# Patient Record
Sex: MALE | Race: WHITE | HISPANIC OR LATINO | URBAN - NONMETROPOLITAN AREA
[De-identification: names, ages, dates, MRNs, and addresses within clinical notes are randomized per-mention and may not be internally consistent; named-entity substitution may affect disease eponyms.]

---

## 2017-06-28 ENCOUNTER — FOLLOW UP ESTABLISHED (OUTPATIENT)
Dept: URBAN - NONMETROPOLITAN AREA CLINIC 6 | Facility: CLINIC | Age: 67
End: 2017-06-28
Payer: COMMERCIAL

## 2017-06-28 DIAGNOSIS — Z79.84 LONG TERM (CURRENT) USE OF ORAL ANTIDIABETIC DRUGS: ICD-10-CM

## 2017-06-28 DIAGNOSIS — H25.13 AGE-RELATED NUCLEAR CATARACT, BILATERAL: ICD-10-CM

## 2017-06-28 PROCEDURE — 92014 COMPRE OPH EXAM EST PT 1/>: CPT | Performed by: OPTOMETRIST

## 2017-06-28 ASSESSMENT — VISUAL ACUITY
OS: 20/20
OD: 20/20

## 2017-06-28 ASSESSMENT — INTRAOCULAR PRESSURE
OS: 14
OD: 14

## 2019-08-21 ENCOUNTER — FOLLOW UP ESTABLISHED (OUTPATIENT)
Dept: URBAN - NONMETROPOLITAN AREA CLINIC 6 | Facility: CLINIC | Age: 69
End: 2019-08-21
Payer: COMMERCIAL

## 2019-08-21 PROCEDURE — 92014 COMPRE OPH EXAM EST PT 1/>: CPT | Performed by: OPTOMETRIST

## 2019-08-21 ASSESSMENT — VISUAL ACUITY
OD: 20/20
OS: 20/20

## 2019-08-21 ASSESSMENT — INTRAOCULAR PRESSURE
OD: 17
OS: 18

## 2020-01-16 ENCOUNTER — FOLLOW UP ESTABLISHED (OUTPATIENT)
Dept: URBAN - NONMETROPOLITAN AREA CLINIC 6 | Facility: CLINIC | Age: 70
End: 2020-01-16
Payer: COMMERCIAL

## 2020-01-16 PROCEDURE — 92014 COMPRE OPH EXAM EST PT 1/>: CPT | Performed by: OPTOMETRIST

## 2020-01-16 ASSESSMENT — KERATOMETRY
OD: 44.22
OS: 44.33

## 2020-01-16 ASSESSMENT — VISUAL ACUITY
OS: 20/20
OD: 20/20

## 2020-01-16 ASSESSMENT — INTRAOCULAR PRESSURE
OS: 15
OD: 14

## 2021-07-21 ENCOUNTER — OFFICE VISIT (OUTPATIENT)
Dept: URBAN - NONMETROPOLITAN AREA CLINIC 6 | Facility: CLINIC | Age: 71
End: 2021-07-21
Payer: COMMERCIAL

## 2021-07-21 PROCEDURE — 99214 OFFICE O/P EST MOD 30 MIN: CPT | Performed by: OPTOMETRIST

## 2021-07-21 ASSESSMENT — INTRAOCULAR PRESSURE
OS: 14
OD: 15

## 2021-07-21 NOTE — IMPRESSION/PLAN
Impression: Age-related nuclear cataract, bilateral: H25.13. Plan: Cataracts account for patient's complaints. Patient understands changing glasses will not improve vision. Discussed all risks, benefits, procedures and recovery. Patient desires to have surgery, recommend CE w/IOL. Recommend surgery OU, OS first. Aim: [place].

## 2021-07-21 NOTE — IMPRESSION/PLAN
Impression: Type 2 diabetes mellitus without complications: E71.6. Plan: Diabetes non-insulin: no non-proliferative diabetic retinopathy, no signs of neovascularization noted. Discussed ocular and systemic benefits of blood sugar control. Continue management under PCP.

## 2021-07-30 ENCOUNTER — OFFICE VISIT (OUTPATIENT)
Dept: URBAN - NONMETROPOLITAN AREA CLINIC 6 | Facility: CLINIC | Age: 71
End: 2021-07-30
Payer: COMMERCIAL

## 2021-07-30 DIAGNOSIS — E11.9 TYPE 2 DIABETES MELLITUS WITHOUT COMPLICATIONS: ICD-10-CM

## 2021-07-30 PROCEDURE — 99204 OFFICE O/P NEW MOD 45 MIN: CPT | Performed by: OPHTHALMOLOGY

## 2021-07-30 ASSESSMENT — INTRAOCULAR PRESSURE
OD: 14
OS: 14

## 2021-07-30 ASSESSMENT — VISUAL ACUITY
OD: 20/40
OS: 20/40

## 2021-07-30 ASSESSMENT — KERATOMETRY
OD: 44.19
OS: 44.56

## 2021-07-30 NOTE — IMPRESSION/PLAN
Impression: Type 2 diabetes mellitus without complications: A88.0. Plan: Diabetes type II: no background retinopathy, no signs of neovascularization noted. Discussed ocular and systemic benefits of blood sugar control.

## 2021-08-11 ENCOUNTER — TESTING ONLY (OUTPATIENT)
Dept: URBAN - NONMETROPOLITAN AREA CLINIC 6 | Facility: CLINIC | Age: 71
End: 2021-08-11
Payer: MEDICARE

## 2021-08-11 PROCEDURE — 92136 OPHTHALMIC BIOMETRY: CPT | Performed by: OPHTHALMOLOGY

## 2021-08-25 ENCOUNTER — SURGERY (OUTPATIENT)
Dept: URBAN - NONMETROPOLITAN AREA SURGERY 1 | Facility: SURGERY | Age: 71
End: 2021-08-25
Payer: MEDICARE

## 2021-08-25 PROCEDURE — 66984 XCAPSL CTRC RMVL W/O ECP: CPT | Performed by: OPHTHALMOLOGY

## 2021-08-26 ENCOUNTER — POST-OPERATIVE VISIT (OUTPATIENT)
Dept: URBAN - NONMETROPOLITAN AREA CLINIC 6 | Facility: CLINIC | Age: 71
End: 2021-08-26
Payer: MEDICARE

## 2021-08-26 PROCEDURE — 99024 POSTOP FOLLOW-UP VISIT: CPT | Performed by: OPTOMETRIST

## 2021-08-26 ASSESSMENT — INTRAOCULAR PRESSURE: OS: 14

## 2021-08-26 NOTE — IMPRESSION/PLAN
Impression: S/P Cataract Extraction by phacoemulsification with IOL placement OS - 1 Day. Encounter for surgical aftercare following surgery on a sense organ  Z48.010. Plan: RTC 1 week for PO w Dr. Lay Torres. --Advised patient to use artificial tears for comfort.

## 2021-09-02 ENCOUNTER — POST-OPERATIVE VISIT (OUTPATIENT)
Dept: URBAN - NONMETROPOLITAN AREA CLINIC 6 | Facility: CLINIC | Age: 71
End: 2021-09-02
Payer: MEDICARE

## 2021-09-02 DIAGNOSIS — Z48.810 ENCOUNTER FOR SURGICAL AFTERCARE FOLLOWING SURGERY ON A SENSE ORGAN: Primary | ICD-10-CM

## 2021-09-02 PROCEDURE — 99024 POSTOP FOLLOW-UP VISIT: CPT | Performed by: OPTOMETRIST

## 2021-09-02 ASSESSMENT — VISUAL ACUITY: OS: 20/20-

## 2021-09-02 ASSESSMENT — INTRAOCULAR PRESSURE
OS: 10
OD: 12

## 2021-09-02 NOTE — IMPRESSION/PLAN
Impression:  Encounter for surgical aftercare following surgery on a sense organ  Z48.810. Excellent post op course   Condition is improving - Cataract OD with symptoms. Ok to proceed with 2nd eye cataract surgery. Plan: --Advised patient to use artificial tears for comfort.

## 2021-09-08 ENCOUNTER — SURGERY (OUTPATIENT)
Dept: URBAN - NONMETROPOLITAN AREA SURGERY 1 | Facility: SURGERY | Age: 71
End: 2021-09-08
Payer: MEDICARE

## 2021-09-08 PROCEDURE — 66984 XCAPSL CTRC RMVL W/O ECP: CPT | Performed by: OPHTHALMOLOGY

## 2021-09-09 ENCOUNTER — POST-OPERATIVE VISIT (OUTPATIENT)
Dept: URBAN - NONMETROPOLITAN AREA CLINIC 6 | Facility: CLINIC | Age: 71
End: 2021-09-09
Payer: MEDICARE

## 2021-09-09 PROCEDURE — 99024 POSTOP FOLLOW-UP VISIT: CPT | Performed by: OPTOMETRIST

## 2021-09-09 ASSESSMENT — INTRAOCULAR PRESSURE
OS: 13
OD: 17

## 2021-09-09 NOTE — IMPRESSION/PLAN
Impression: S/P Cataract Extraction by phacoemulsification with IOL placement OD - 1 Day. Presence of intraocular lens  Z96.1. Plan: RTC in 2 weeks for PO. --Advised patient to use artificial tears for comfort.

## 2021-09-22 ENCOUNTER — POST-OPERATIVE VISIT (OUTPATIENT)
Dept: URBAN - NONMETROPOLITAN AREA CLINIC 6 | Facility: CLINIC | Age: 71
End: 2021-09-22
Payer: MEDICARE

## 2021-09-22 DIAGNOSIS — Z96.1 PRESENCE OF INTRAOCULAR LENS: Primary | ICD-10-CM

## 2021-09-22 PROCEDURE — 99024 POSTOP FOLLOW-UP VISIT: CPT | Performed by: OPTOMETRIST

## 2021-09-22 ASSESSMENT — INTRAOCULAR PRESSURE
OD: 13
OS: 12

## 2021-09-22 NOTE — IMPRESSION/PLAN
Impression: S/P Cataract Extraction by phacoemulsification with IOL placement OD - 14 Days. Presence of intraocular lens  Z96.1. Post operative instructions reviewed - Condition is improving - Plan: Patient happy with just using otc readers. --Advised patient to use artificial tears for comfort.

## 2023-06-27 ENCOUNTER — OFFICE VISIT (OUTPATIENT)
Dept: URBAN - NONMETROPOLITAN AREA CLINIC 18 | Facility: CLINIC | Age: 73
End: 2023-06-27
Payer: MEDICARE

## 2023-06-27 DIAGNOSIS — H35.3131 NONEXUDATIVE AGE-RELATED MACULAR DEGENERATION, BILATERAL, EARLY DRY STAGE: ICD-10-CM

## 2023-06-27 DIAGNOSIS — H02.889 MEIBOMIAN GLAND DYSFUNCTION OF EYE: ICD-10-CM

## 2023-06-27 DIAGNOSIS — H43.811 VITREOUS DEGENERATION, RIGHT EYE: ICD-10-CM

## 2023-06-27 DIAGNOSIS — E11.3293 TYPE 2 DIAB W MILD NONPRLF DIABETIC RTNOP W/O MACULAR EDEMA, BILATERAL: Primary | ICD-10-CM

## 2023-06-27 DIAGNOSIS — H16.142 PUNCTATE KERATITIS, LEFT EYE: ICD-10-CM

## 2023-06-27 DIAGNOSIS — H43.23 CRYSTALLINE DEPOSITS IN VITREOUS BODY, BILATERAL: ICD-10-CM

## 2023-06-27 DIAGNOSIS — Z96.1 PRESENCE OF PSEUDOPHAKIA: ICD-10-CM

## 2023-06-27 PROCEDURE — 92250 FUNDUS PHOTOGRAPHY W/I&R: CPT | Performed by: OPTOMETRIST

## 2023-06-27 PROCEDURE — 99203 OFFICE O/P NEW LOW 30 MIN: CPT | Performed by: OPTOMETRIST

## 2023-06-27 ASSESSMENT — INTRAOCULAR PRESSURE
OD: 9
OS: 9

## 2023-06-27 ASSESSMENT — VISUAL ACUITY
OD: 20/20
OS: 20/20

## 2023-06-27 ASSESSMENT — KERATOMETRY
OS: 44.38
OD: 44.38

## 2023-06-27 NOTE — IMPRESSION/PLAN
Impression: Type 2 diab w mild nonprlf diabetic rtnop w/o macular edema, bilateral: I76.0600. Plan: Reviewed with patient that mild retinal vascular changes are occurring from diabetes. Fundus photos today, mild NPDR OU. These changes do not require treatment at this time, however, the patient is aware that the condition can progress and may require additional treatment in the future. Systemic monitoring with PCP/endocrinologist is recommended to decrease risk of damage from diabetes. Will continue to observe with dilated examination in 12 months.

## 2023-06-27 NOTE — IMPRESSION/PLAN
Impression: Nonexudative age-related macular degeneration, bilateral, early dry stage: H35.3131. Plan: Discussed condition in detail with patient with review of macula status and potential symptoms. [place]ler grid with instructions provided. Patient knows to return to clinic immediately if any changes in vision are experienced for additional evaluation.

## 2023-06-27 NOTE — IMPRESSION/PLAN
Impression: Crystalline deposits in vitreous body, bilateral: H43.23. Plan: Will monitor with regular dilated exams.

## 2023-06-27 NOTE — IMPRESSION/PLAN
Impression: Meibomian gland dysfunction of eye: H02.889. Plan: Discussed warm compresses and gel drop QHS OU.